# Patient Record
Sex: FEMALE | Race: WHITE
[De-identification: names, ages, dates, MRNs, and addresses within clinical notes are randomized per-mention and may not be internally consistent; named-entity substitution may affect disease eponyms.]

---

## 2018-02-13 ENCOUNTER — RESULT REVIEW (OUTPATIENT)
Age: 68
End: 2018-02-13

## 2019-12-23 PROBLEM — Z00.00 ENCOUNTER FOR PREVENTIVE HEALTH EXAMINATION: Status: ACTIVE | Noted: 2019-12-23

## 2020-01-13 ENCOUNTER — HOSPITAL ENCOUNTER (OUTPATIENT)
Dept: HOSPITAL 74 - FASU-ENDO | Age: 70
Discharge: HOME | End: 2020-01-13
Attending: INTERNAL MEDICINE
Payer: COMMERCIAL

## 2020-01-13 VITALS — BODY MASS INDEX: 29.7 KG/M2

## 2020-01-13 VITALS — TEMPERATURE: 97.7 F

## 2020-01-13 VITALS — HEART RATE: 61 BPM | DIASTOLIC BLOOD PRESSURE: 75 MMHG | SYSTOLIC BLOOD PRESSURE: 143 MMHG

## 2020-01-13 DIAGNOSIS — Z86.010: Primary | ICD-10-CM

## 2020-01-13 DIAGNOSIS — Z80.0: ICD-10-CM

## 2020-01-13 DIAGNOSIS — K29.50: ICD-10-CM

## 2020-01-13 DIAGNOSIS — D12.3: ICD-10-CM

## 2020-01-13 PROCEDURE — 0DBL8ZX EXCISION OF TRANSVERSE COLON, VIA NATURAL OR ARTIFICIAL OPENING ENDOSCOPIC, DIAGNOSTIC: ICD-10-PCS | Performed by: INTERNAL MEDICINE

## 2020-01-13 PROCEDURE — 0DB68ZX EXCISION OF STOMACH, VIA NATURAL OR ARTIFICIAL OPENING ENDOSCOPIC, DIAGNOSTIC: ICD-10-PCS | Performed by: INTERNAL MEDICINE

## 2020-01-13 PROCEDURE — 0DB98ZX EXCISION OF DUODENUM, VIA NATURAL OR ARTIFICIAL OPENING ENDOSCOPIC, DIAGNOSTIC: ICD-10-PCS | Performed by: INTERNAL MEDICINE

## 2020-01-16 NOTE — PATH
Surgical Pathology Report



Patient Name:  LISANDRA LEGER

Accession #:  [id number]

Med. Rec. #:  [id number]                                                        

   /Age/Gender:  1950 (Age: 69) / F

Account:  [id number]                                                          

             Location: [facility]

Taken:  2020

Received:  2020

Reported:  2020

Physicians:  Jean-Paul Bernal M.D.

  



Specimen(s) Received

A: SECOND PORTION DUODENUM 

B: GASTRIC ANTRUM 

C: BX POLYP HEPATIC FLEXURE 

D: BX POLYP TRANSVERSE COLON 

E: BX POLYP SPLENIC FLEXURE 





Clinical History

GERD, family history of colon cancer

Postoperative diagnosis: Gastritis, colon polyps







Final Diagnosis

A. SECOND PORTION OF DUODENUM, BIOPSY:

DUODENAL MUCOSA WITH NO PATHOLOGIC FINDINGS.



B. GASTRIC ANTRUM, BIOPSY:

MILD CHRONIC GASTRITIS.

IMMUNOSTAIN IS NEGATIVE FOR H. PYLORI ORGANISMS.



C. HEPATIC FLEXURE, POLYP, BIOPSY:

TUBULAR ADENOMA.



D. TRANSVERSE COLON, POLYP, BIOPSY:

TUBULAR ADENOMA.



E. SPLENIC FLEXURE, POLYP, BIOPSY:

TUBULAR ADENOMA.





***Electronically Signed***

Lakeisha Arriaza M.D.





Gross Description

A.  Received in formalin, labeled "biopsy second portion of duodenum" are 2 tan,

irregular portions of soft tissue measuring 0.3 and 0.4 cm. in greatest

dimension. The specimens are submitted in toto in one cassette.



B.  Received in formalin, labeled "biopsy gastric antrum" are 2 tan, irregular

portions of soft tissue measuring 0.2 and 0.3 cm. in greatest dimension. The

specimens are submitted in toto in one cassette.



C.  Received in formalin, labeled "biopsy hepatic flexure" is a tan, irregular

portion of soft tissue measuring 0.3 cm. in greatest dimension. The specimen is

submitted in toto in one cassette.



D.  Received in formalin, labeled "biopsy polyp transverse colon" are 4 tan,

irregular portions of soft tissue ranging from 0.1-0.5 cm. in greatest

dimension. The specimens are submitted in toto in one cassette.



E.  Received in formalin, labeled "biopsy polyp splenic flexure" are 2 tan,

irregular portions of soft tissue averaging 0.1 cm. in greatest dimension. The

specimens are submitted in toto in one cassette.





saudi

## 2020-01-17 ENCOUNTER — RECORD ABSTRACTING (OUTPATIENT)
Age: 70
End: 2020-01-17

## 2020-01-17 DIAGNOSIS — E66.3 OVERWEIGHT: ICD-10-CM

## 2020-01-17 DIAGNOSIS — Z87.09 PERSONAL HISTORY OF OTHER DISEASES OF THE RESPIRATORY SYSTEM: ICD-10-CM

## 2020-01-17 DIAGNOSIS — Z87.19 PERSONAL HISTORY OF OTHER DISEASES OF THE DIGESTIVE SYSTEM: ICD-10-CM

## 2020-01-17 DIAGNOSIS — E78.00 PURE HYPERCHOLESTEROLEMIA, UNSPECIFIED: ICD-10-CM

## 2020-01-17 DIAGNOSIS — Z82.49 FAMILY HISTORY OF ISCHEMIC HEART DISEASE AND OTHER DISEASES OF THE CIRCULATORY SYSTEM: ICD-10-CM

## 2020-01-17 DIAGNOSIS — I63.9 CEREBRAL INFARCTION, UNSPECIFIED: ICD-10-CM

## 2020-01-17 DIAGNOSIS — Z78.9 OTHER SPECIFIED HEALTH STATUS: ICD-10-CM

## 2020-01-17 DIAGNOSIS — Z86.39 PERSONAL HISTORY OF OTHER ENDOCRINE, NUTRITIONAL AND METABOLIC DISEASE: ICD-10-CM

## 2020-01-17 DIAGNOSIS — E11.9 TYPE 2 DIABETES MELLITUS W/OUT COMPLICATIONS: ICD-10-CM

## 2020-01-17 LAB — CYTOLOGY CVX/VAG DOC THIN PREP: NORMAL

## 2020-01-17 RX ORDER — ATORVASTATIN CALCIUM 20 MG/1
20 TABLET, FILM COATED ORAL DAILY
Refills: 0 | Status: ACTIVE | COMMUNITY

## 2020-01-17 RX ORDER — MONTELUKAST SODIUM 10 MG/1
10 TABLET, FILM COATED ORAL
Refills: 0 | Status: ACTIVE | COMMUNITY

## 2020-01-17 RX ORDER — SITAGLIPTIN AND METFORMIN HYDROCHLORIDE 50; 1000 MG/1; MG/1
TABLET, FILM COATED ORAL
Refills: 0 | Status: ACTIVE | COMMUNITY

## 2020-01-17 RX ORDER — ASPIRIN 81 MG/1
81 TABLET ORAL DAILY
Refills: 0 | Status: ACTIVE | COMMUNITY

## 2020-02-21 ENCOUNTER — APPOINTMENT (OUTPATIENT)
Dept: OBGYN | Facility: CLINIC | Age: 70
End: 2020-02-21
Payer: MEDICARE

## 2020-02-21 VITALS
BODY MASS INDEX: 29.41 KG/M2 | DIASTOLIC BLOOD PRESSURE: 70 MMHG | WEIGHT: 183 LBS | SYSTOLIC BLOOD PRESSURE: 120 MMHG | HEIGHT: 66 IN

## 2020-02-21 DIAGNOSIS — Z01.419 ENCOUNTER FOR GYNECOLOGICAL EXAMINATION (GENERAL) (ROUTINE) W/OUT ABNORMAL FINDINGS: ICD-10-CM

## 2020-02-21 DIAGNOSIS — N95.2 POSTMENOPAUSAL ATROPHIC VAGINITIS: ICD-10-CM

## 2020-02-21 PROCEDURE — 99213 OFFICE O/P EST LOW 20 MIN: CPT

## 2020-02-21 NOTE — PHYSICAL EXAM
[Vulvar Atrophy] : vulvar atrophy [Normal] : uterus [No Bleeding] : there was no active vaginal bleeding [Atrophy] : atrophy [Uterine Adnexae] : were not tender and not enlarged

## 2020-03-16 LAB
CYTOLOGY CVX/VAG DOC THIN PREP: ABNORMAL
HPV HIGH+LOW RISK DNA PNL CVX: NOT DETECTED

## 2020-12-23 PROBLEM — Z01.419 ENCOUNTER FOR GYNECOLOGICAL EXAMINATION WITHOUT ABNORMAL FINDING: Status: RESOLVED | Noted: 2020-02-21 | Resolved: 2020-12-23
